# Patient Record
Sex: FEMALE | Race: WHITE | URBAN - METROPOLITAN AREA
[De-identification: names, ages, dates, MRNs, and addresses within clinical notes are randomized per-mention and may not be internally consistent; named-entity substitution may affect disease eponyms.]

---

## 2021-11-29 LAB
ANION GAP SERPL CALCULATED.3IONS-SCNC: 13 MEQ/L (ref 9–15)
BASOPHILS ABSOLUTE: 0.1 K/UL (ref 0–0.2)
BASOPHILS RELATIVE PERCENT: 1.3 %
BUN BLDV-MCNC: 8 MG/DL (ref 6–20)
CALCIUM SERPL-MCNC: 10 MG/DL (ref 8.5–9.9)
CHLORIDE BLD-SCNC: 99 MEQ/L (ref 95–107)
CO2: 28 MEQ/L (ref 20–31)
CREAT SERPL-MCNC: 0.46 MG/DL (ref 0.5–0.9)
EOSINOPHILS ABSOLUTE: 0 K/UL (ref 0–0.7)
EOSINOPHILS RELATIVE PERCENT: 0.4 %
GFR AFRICAN AMERICAN: >60
GFR NON-AFRICAN AMERICAN: >60
GLUCOSE BLD-MCNC: 80 MG/DL (ref 70–99)
HCT VFR BLD CALC: 32 % (ref 37–47)
HEMOGLOBIN: 10.5 G/DL (ref 12–16)
LYMPHOCYTES ABSOLUTE: 1 K/UL (ref 1–4.8)
LYMPHOCYTES RELATIVE PERCENT: 9.2 %
MCH RBC QN AUTO: 33 PG (ref 27–31.3)
MCHC RBC AUTO-ENTMCNC: 32.7 % (ref 33–37)
MCV RBC AUTO: 100.8 FL (ref 82–100)
MONOCYTES ABSOLUTE: 0.6 K/UL (ref 0.2–0.8)
MONOCYTES RELATIVE PERCENT: 5.7 %
NEUTROPHILS ABSOLUTE: 9 K/UL (ref 1.4–6.5)
NEUTROPHILS RELATIVE PERCENT: 83.4 %
PDW BLD-RTO: 16.3 % (ref 11.5–14.5)
PLATELET # BLD: 426 K/UL (ref 130–400)
POTASSIUM SERPL-SCNC: 3.8 MEQ/L (ref 3.4–4.9)
RBC # BLD: 3.18 M/UL (ref 4.2–5.4)
SODIUM BLD-SCNC: 140 MEQ/L (ref 135–144)
TSH SERPL DL<=0.05 MIU/L-ACNC: 2.25 UIU/ML (ref 0.44–3.86)
WBC # BLD: 10.8 K/UL (ref 4.8–10.8)

## 2021-11-30 ENCOUNTER — OFFICE VISIT (OUTPATIENT)
Dept: GERIATRIC MEDICINE | Age: 51
End: 2021-11-30
Payer: MEDICAID

## 2021-11-30 DIAGNOSIS — U07.1 COVID-19: Primary | ICD-10-CM

## 2021-11-30 PROCEDURE — 99305 1ST NF CARE MODERATE MDM 35: CPT | Performed by: INTERNAL MEDICINE

## 2021-11-30 PROCEDURE — G8484 FLU IMMUNIZE NO ADMIN: HCPCS | Performed by: INTERNAL MEDICINE

## 2021-12-01 DIAGNOSIS — R52 ACUTE PAIN: Primary | ICD-10-CM

## 2021-12-01 RX ORDER — OXYCODONE HYDROCHLORIDE AND ACETAMINOPHEN 5; 325 MG/1; MG/1
1 TABLET ORAL EVERY 4 HOURS PRN
Qty: 28 TABLET | Refills: 0 | Status: SHIPPED | OUTPATIENT
Start: 2021-12-01 | End: 2021-12-10 | Stop reason: SDUPTHER

## 2021-12-02 ENCOUNTER — OFFICE VISIT (OUTPATIENT)
Dept: GERIATRIC MEDICINE | Age: 51
End: 2021-12-02
Payer: MEDICAID

## 2021-12-02 DIAGNOSIS — U07.1 COVID-19: Primary | ICD-10-CM

## 2021-12-02 PROCEDURE — 99308 SBSQ NF CARE LOW MDM 20: CPT | Performed by: INTERNAL MEDICINE

## 2021-12-02 PROCEDURE — G8484 FLU IMMUNIZE NO ADMIN: HCPCS | Performed by: INTERNAL MEDICINE

## 2021-12-05 ENCOUNTER — OFFICE VISIT (OUTPATIENT)
Dept: GERIATRIC MEDICINE | Age: 51
End: 2021-12-05
Payer: MEDICAID

## 2021-12-05 DIAGNOSIS — U07.1 COVID-19: Primary | ICD-10-CM

## 2021-12-05 PROCEDURE — 99308 SBSQ NF CARE LOW MDM 20: CPT | Performed by: INTERNAL MEDICINE

## 2021-12-05 PROCEDURE — G8484 FLU IMMUNIZE NO ADMIN: HCPCS | Performed by: INTERNAL MEDICINE

## 2021-12-06 LAB
BASOPHILS ABSOLUTE: 0.1 K/UL (ref 0–0.2)
BASOPHILS RELATIVE PERCENT: 0.9 %
EOSINOPHILS ABSOLUTE: 0.1 K/UL (ref 0–0.7)
EOSINOPHILS RELATIVE PERCENT: 2 %
HCT VFR BLD CALC: 32.6 % (ref 37–47)
HEMOGLOBIN: 10.6 G/DL (ref 12–16)
LYMPHOCYTES ABSOLUTE: 1.3 K/UL (ref 1–4.8)
LYMPHOCYTES RELATIVE PERCENT: 18.5 %
MCH RBC QN AUTO: 32.5 PG (ref 27–31.3)
MCHC RBC AUTO-ENTMCNC: 32.4 % (ref 33–37)
MCV RBC AUTO: 100.5 FL (ref 82–100)
MONOCYTES ABSOLUTE: 0.5 K/UL (ref 0.2–0.8)
MONOCYTES RELATIVE PERCENT: 7 %
NEUTROPHILS ABSOLUTE: 5 K/UL (ref 1.4–6.5)
NEUTROPHILS RELATIVE PERCENT: 71.6 %
PDW BLD-RTO: 16.7 % (ref 11.5–14.5)
PLATELET # BLD: 426 K/UL (ref 130–400)
RBC # BLD: 3.25 M/UL (ref 4.2–5.4)
WBC # BLD: 7 K/UL (ref 4.8–10.8)

## 2021-12-08 ENCOUNTER — OFFICE VISIT (OUTPATIENT)
Dept: GERIATRIC MEDICINE | Age: 51
End: 2021-12-08
Payer: MEDICAID

## 2021-12-08 DIAGNOSIS — S42.291D: Primary | ICD-10-CM

## 2021-12-08 DIAGNOSIS — S32.502D CLOSED FRACTURE OF LEFT PUBIS WITH ROUTINE HEALING, UNSPECIFIED PORTION OF PUBIS, SUBSEQUENT ENCOUNTER: ICD-10-CM

## 2021-12-08 DIAGNOSIS — E87.6 HYPOKALEMIA: ICD-10-CM

## 2021-12-08 DIAGNOSIS — D69.6 THROMBOCYTOPENIA (HCC): ICD-10-CM

## 2021-12-08 DIAGNOSIS — S32.10XD CLOSED FRACTURE OF SACRUM WITH ROUTINE HEALING, UNSPECIFIED PORTION OF SACRUM, SUBSEQUENT ENCOUNTER: ICD-10-CM

## 2021-12-08 DIAGNOSIS — M84.459D: ICD-10-CM

## 2021-12-08 PROCEDURE — 99309 SBSQ NF CARE MODERATE MDM 30: CPT | Performed by: NURSE PRACTITIONER

## 2021-12-08 PROCEDURE — G8484 FLU IMMUNIZE NO ADMIN: HCPCS | Performed by: NURSE PRACTITIONER

## 2021-12-08 RX ORDER — THIAMINE MONONITRATE (VIT B1) 100 MG
100 TABLET ORAL DAILY
COMMUNITY

## 2021-12-08 RX ORDER — MAGNESIUM OXIDE 400 MG/1
400 TABLET ORAL DAILY
COMMUNITY

## 2021-12-08 RX ORDER — POTASSIUM CHLORIDE 20 MEQ/1
20 TABLET, EXTENDED RELEASE ORAL 2 TIMES DAILY
COMMUNITY

## 2021-12-08 RX ORDER — NICOTINE 21 MG/24HR
1 PATCH, TRANSDERMAL 24 HOURS TRANSDERMAL EVERY 24 HOURS
COMMUNITY

## 2021-12-08 RX ORDER — MULTIVIT-MINS NO.63/IRON/FOLIC 27 MG-1 MG
1 TABLET ORAL DAILY
COMMUNITY

## 2021-12-08 RX ORDER — FOLIC ACID 1 MG/1
1 TABLET ORAL DAILY
COMMUNITY

## 2021-12-10 DIAGNOSIS — R52 ACUTE PAIN: ICD-10-CM

## 2021-12-13 RX ORDER — OXYCODONE HYDROCHLORIDE AND ACETAMINOPHEN 5; 325 MG/1; MG/1
1 TABLET ORAL EVERY 6 HOURS PRN
Qty: 28 TABLET | Refills: 0 | Status: SHIPPED | OUTPATIENT
Start: 2021-12-13 | End: 2021-12-20

## 2021-12-14 ENCOUNTER — OFFICE VISIT (OUTPATIENT)
Dept: GERIATRIC MEDICINE | Age: 51
End: 2021-12-14
Payer: MEDICAID

## 2021-12-14 DIAGNOSIS — S42.291D: Primary | ICD-10-CM

## 2021-12-14 PROCEDURE — G8484 FLU IMMUNIZE NO ADMIN: HCPCS | Performed by: NURSE PRACTITIONER

## 2021-12-14 PROCEDURE — 99308 SBSQ NF CARE LOW MDM 20: CPT | Performed by: NURSE PRACTITIONER

## 2021-12-20 ENCOUNTER — OFFICE VISIT (OUTPATIENT)
Dept: GERIATRIC MEDICINE | Age: 51
End: 2021-12-20
Payer: MEDICAID

## 2021-12-20 DIAGNOSIS — U07.1 COVID: Primary | ICD-10-CM

## 2021-12-20 DIAGNOSIS — J02.9 PHARYNGITIS, UNSPECIFIED ETIOLOGY: ICD-10-CM

## 2021-12-20 LAB
ALBUMIN SERPL-MCNC: 4 G/DL (ref 3.5–4.6)
ALP BLD-CCNC: 143 U/L (ref 40–130)
ALT SERPL-CCNC: 17 U/L (ref 0–33)
ANION GAP SERPL CALCULATED.3IONS-SCNC: 15 MEQ/L (ref 9–15)
AST SERPL-CCNC: 25 U/L (ref 0–35)
BILIRUB SERPL-MCNC: 0.3 MG/DL (ref 0.2–0.7)
BUN BLDV-MCNC: 7 MG/DL (ref 6–20)
C-REACTIVE PROTEIN: 4.2 MG/L (ref 0–5)
CALCIUM SERPL-MCNC: 10 MG/DL (ref 8.5–9.9)
CHLORIDE BLD-SCNC: 96 MEQ/L (ref 95–107)
CO2: 24 MEQ/L (ref 20–31)
CREAT SERPL-MCNC: 0.44 MG/DL (ref 0.5–0.9)
D DIMER: 1.36 MG/L FEU (ref 0–0.5)
GFR AFRICAN AMERICAN: >60
GFR NON-AFRICAN AMERICAN: >60
GLOBULIN: 4.3 G/DL (ref 2.3–3.5)
GLUCOSE BLD-MCNC: 92 MG/DL (ref 70–99)
HCT VFR BLD CALC: 33.9 % (ref 37–47)
HEMOGLOBIN: 11.2 G/DL (ref 12–16)
INR BLD: 1.2
MCH RBC QN AUTO: 32.5 PG (ref 27–31.3)
MCHC RBC AUTO-ENTMCNC: 33 % (ref 33–37)
MCV RBC AUTO: 98.4 FL (ref 82–100)
PDW BLD-RTO: 15.3 % (ref 11.5–14.5)
PLATELET # BLD: 187 K/UL (ref 130–400)
POTASSIUM SERPL-SCNC: 4 MEQ/L (ref 3.4–4.9)
PROCALCITONIN: 0.13 NG/ML (ref 0–0.15)
PROTHROMBIN TIME: 15.3 SEC (ref 12.3–14.9)
RBC # BLD: 3.44 M/UL (ref 4.2–5.4)
SODIUM BLD-SCNC: 135 MEQ/L (ref 135–144)
TOTAL CK: 44 U/L (ref 0–170)
TOTAL PROTEIN: 8.3 G/DL (ref 6.3–8)
TROPONIN: <0.01 NG/ML (ref 0–0.01)
WBC # BLD: 6.6 K/UL (ref 4.8–10.8)

## 2021-12-20 PROCEDURE — G8484 FLU IMMUNIZE NO ADMIN: HCPCS | Performed by: NURSE PRACTITIONER

## 2021-12-20 PROCEDURE — 99497 ADVNCD CARE PLAN 30 MIN: CPT | Performed by: NURSE PRACTITIONER

## 2021-12-20 PROCEDURE — 99309 SBSQ NF CARE MODERATE MDM 30: CPT | Performed by: NURSE PRACTITIONER

## 2021-12-21 ENCOUNTER — OFFICE VISIT (OUTPATIENT)
Dept: GERIATRIC MEDICINE | Age: 51
End: 2021-12-21
Payer: MEDICAID

## 2021-12-21 DIAGNOSIS — U07.1 COVID: Primary | ICD-10-CM

## 2021-12-21 LAB — FERRITIN: 254 UG/L (ref 13–150)

## 2021-12-21 PROCEDURE — G8484 FLU IMMUNIZE NO ADMIN: HCPCS | Performed by: NURSE PRACTITIONER

## 2021-12-21 PROCEDURE — 99308 SBSQ NF CARE LOW MDM 20: CPT | Performed by: NURSE PRACTITIONER

## 2021-12-23 ENCOUNTER — OFFICE VISIT (OUTPATIENT)
Dept: GERIATRIC MEDICINE | Age: 51
End: 2021-12-23
Payer: MEDICAID

## 2021-12-23 DIAGNOSIS — U07.1 COVID-19: Primary | ICD-10-CM

## 2021-12-23 PROCEDURE — 99309 SBSQ NF CARE MODERATE MDM 30: CPT | Performed by: INTERNAL MEDICINE

## 2021-12-23 PROCEDURE — G8484 FLU IMMUNIZE NO ADMIN: HCPCS | Performed by: INTERNAL MEDICINE

## 2021-12-30 NOTE — PROGRESS NOTES
Subjective:     CC: COVID-19    HPI:  Eloy Dorsey is a 49 y.o. female was seen today for COVID 19 evaluation. Patient was admitted here after recent hospitalization for COVID-19 patient is at baseline cognitive. Patient was stabilized patient did undergo course physical therapy respiratory was patient has been on universal cautions transferred here for ongoing course of care for respiration impairment. Patient has been COVID 19 positive since 11/25. They were seen with full PPE and observing continued droplet precautions. Condition discussed with nursing staff and treatment reviewed. Recent bloodwork, chest x-ray and vital signs reviewed. Fever curve and oxygen demands reviewed. Nutritional status and intake reviewed. Patient is not demonstrating increased respiratory complaints at this time. Patient has not been febrile in the last 24 hours. Patient is able to feed themselves. Patient is not demonstrating increased oxygen demand. No past medical history on file. No past surgical history on file. No family history on file. Social History     Socioeconomic History    Marital status:      Spouse name: Not on file    Number of children: Not on file    Years of education: Not on file    Highest education level: Not on file   Occupational History    Not on file   Tobacco Use    Smoking status: Not on file    Smokeless tobacco: Not on file   Substance and Sexual Activity    Alcohol use: Not on file    Drug use: Not on file    Sexual activity: Not on file   Other Topics Concern    Not on file   Social History Narrative    Not on file     Social Determinants of Health     Financial Resource Strain:     Difficulty of Paying Living Expenses: Not on file   Food Insecurity:     Worried About Running Out of Food in the Last Year: Not on file    Shell of Food in the Last Year: Not on file   Transportation Needs:     Lack of Transportation (Medical):  Not on file    Lack of Transportation (Non-Medical): Not on file   Physical Activity:     Days of Exercise per Week: Not on file    Minutes of Exercise per Session: Not on file   Stress:     Feeling of Stress : Not on file   Social Connections:     Frequency of Communication with Friends and Family: Not on file    Frequency of Social Gatherings with Friends and Family: Not on file    Attends Ep[employer] Services: Not on file    Active Member of [address] [place] [address] or Organizations: Not on file    Attends Club or Organization Meetings: Not on file    Marital Status: Not on file   Intimate Partner Violence:     Fear of Current or Ex-Partner: Not on file    Emotionally Abused: Not on file    Physically Abused: Not on file    Sexually Abused: Not on file   Housing Stability:     Unable to Pay for Housing in the Last Year: Not on file    Number of [place] in the Last Year: Not on file    Unstable Housing in the Last Year: Not on file     No current outpatient medications on file prior to visit. No current facility-administered medications on file prior to visit. Review of Systems   Unable to perform ROS: Mental status change   All other systems reviewed and are negative. Objective:     Physical Exam  Vitals and nursing note reviewed. Constitutional:       Appearance: Normal appearance. HENT:      Head: Normocephalic. Mouth/Throat:      Mouth: Mucous membranes are moist.   Cardiovascular:      Rate and Rhythm: Normal rate and regular rhythm. Pulses: Normal pulses. Heart sounds: Normal heart sounds. Pulmonary:      Effort: Pulmonary effort is normal.      Breath sounds: Wheezing present. Abdominal:      General: Abdomen is flat. Palpations: Abdomen is soft. Musculoskeletal:         General: No swelling. Normal range of motion. Cervical back: Normal range of motion. No tenderness. Skin:     General: Skin is warm. Findings: No rash. Neurological:      Mental Status: She is alert. Mental status is at baseline. Psychiatric:         Mood and Affect: Mood normal.          .[facility]  Lab Results   Component Value Date    FERRITIN 254 (H) 12/20/2021     Lab Results   Component Value Date    WBC 6.6 12/20/2021    HGB 11.2 (L) 12/20/2021    HCT 33.9 (L) 12/20/2021    MCV 98.4 12/20/2021     12/20/2021     Lab Results   Component Value Date     12/20/2021    K 4.0 12/20/2021    CL 96 12/20/2021    CO2 24 12/20/2021    BUN 7 12/20/2021    CREATININE 0.44 12/20/2021    GLUCOSE 92 12/20/2021    CALCIUM 10.0 12/20/2021      Lab Results   Component Value Date    CKTOTAL 44 12/20/2021    TROPONINI <0.010 12/20/2021     Lab Results   Component Value Date    CRP 4.2 12/20/2021      Lab Results   Component Value Date    DDIMER 1.36 ([facility]) 12/20/2021      Lab Results   Component Value Date    CKTOTAL 44 12/20/2021       Please refer to on site chart for most recent chest Xray result, reviewed at this time. Assessment & Plan:     Continue with current regimen of  Vitamin C 500mg PO BID  Vitamin D 1000 IU PO QD  Zinc 50 mg PO QD  Lovenox 30mg SQ BID    Patient does require Dexamethasone 6mg PO QD  Patient does not  require antibiotics for concomitant bacterial pneumonia. Patient does not qualify for monoclonal antibodies. Continue to monitor blood work:  Weekly D-Dimer, LDH, CRP, CPK, Procalcitonin, Ferritin, Troponin, CBC, BMP. Patient's case discussed with nursing staff and Code Status reviewed. Please note orders entered on site at facility after discussion with appropriate facility nursing/therapy/ / nutritional staff. Current longstanding medical problems and acute medical issues addressed with staff. Available data and data elements in on site paper chart reviewed and analyzed. Current external consultant notes reviewed in on site chart. Ordered laboratory testing and imaging will be reviewed when available.      Zoya Chaudhari MD

## 2021-12-31 NOTE — PROGRESS NOTES
Subjective:     CC: COVID-19    HPI:  Ginger Traore is a 49 y.o. female was seen today for COVID 19 evaluation. Patient has been COVID 19 positive since 11/29. They were seen with full PPE and observing continued droplet precautions. Condition discussed with nursing staff and treatment reviewed. Recent bloodwork, chest x-ray and vital signs reviewed. Fever curve and oxygen demands reviewed. Nutritional status and intake reviewed. Patient is not demonstrating increased respiratory complaints at this time. Patient has not been febrile in the last 24 hours. Patient is able to feed themselves. Patient is not demonstrating increased oxygen demand. No past medical history on file. No past surgical history on file. No family history on file. Social History     Socioeconomic History    Marital status:      Spouse name: Not on file    Number of children: Not on file    Years of education: Not on file    Highest education level: Not on file   Occupational History    Not on file   Tobacco Use    Smoking status: Not on file    Smokeless tobacco: Not on file   Substance and Sexual Activity    Alcohol use: Not on file    Drug use: Not on file    Sexual activity: Not on file   Other Topics Concern    Not on file   Social History Narrative    Not on file     Social Determinants of Health     Financial Resource Strain:     Difficulty of Paying Living Expenses: Not on file   Food Insecurity:     Worried About Running Out of Food in the Last Year: Not on file    Shell of Food in the Last Year: Not on file   Transportation Needs:     Lack of Transportation (Medical): Not on file    Lack of Transportation (Non-Medical):  Not on file   Physical Activity:     Days of Exercise per Week: Not on file    Minutes of Exercise per Session: Not on file   Stress:     Feeling of Stress : Not on file   Social Connections:     Frequency of Communication with Friends and Family: Not on file    Frequency of Social Gatherings with Friends and Family: Not on file    Attends Gnosticist Services: Not on file    Active Member of Clubs or Organizations: Not on file    Attends Club or Organization Meetings: Not on file    Marital Status: Not on file   Intimate Partner Violence:     Fear of Current or Ex-Partner: Not on file    Emotionally Abused: Not on file    Physically Abused: Not on file    Sexually Abused: Not on file   Housing Stability:     Unable to Pay for Housing in the Last Year: Not on file    Number of [place] in the Last Year: Not on file    Unstable Housing in the Last Year: Not on file     No current outpatient medications on file prior to visit. No current facility-administered medications on file prior to visit. Review of Systems   Unable to perform ROS: Psychiatric disorder   All other systems reviewed and are negative. Objective:     Physical Exam  Vitals and nursing note reviewed. Constitutional:       Appearance: Normal appearance. She is obese. HENT:      Head: Normocephalic and atraumatic. Nose: Nose normal.      Mouth/Throat:      Mouth: Mucous membranes are moist.   Eyes:      Extraocular Movements: Extraocular movements intact. Cardiovascular:      Rate and Rhythm: Normal rate and regular rhythm. Pulses: Normal pulses. Pulmonary:      Effort: Pulmonary effort is normal.      Breath sounds: No wheezing. Abdominal:      General: Bowel sounds are normal.      Palpations: Abdomen is soft. There is no mass. Musculoskeletal:         General: No swelling. Normal range of motion. Cervical back: Neck supple. No tenderness. Skin:     General: Skin is warm and dry. Neurological:      Mental Status: Mental status is at baseline.    Psychiatric:         Mood and Affect: Mood normal.          .[facility]  Lab Results   Component Value Date    FERRITIN 254 (H) 12/20/2021     Lab Results   Component Value Date    WBC 6.6 12/20/2021    HGB 11.2 (L) 12/20/2021    HCT 33.9 (L) 12/20/2021    MCV 98.4 12/20/2021     12/20/2021     Lab Results   Component Value Date     12/20/2021    K 4.0 12/20/2021    CL 96 12/20/2021    CO2 24 12/20/2021    BUN 7 12/20/2021    CREATININE 0.44 12/20/2021    GLUCOSE 92 12/20/2021    CALCIUM 10.0 12/20/2021      Lab Results   Component Value Date    CKTOTAL 44 12/20/2021    TROPONINI <0.010 12/20/2021     Lab Results   Component Value Date    CRP 4.2 12/20/2021      Lab Results   Component Value Date    DDIMER 1.36 ([place]) 12/20/2021      Lab Results   Component Value Date    CKTOTAL 44 12/20/2021       Please refer to on site chart for most recent chest Xray result, reviewed at this time. Assessment & Plan:     Continue with current regimen of  Vitamin C 500mg PO BID  Vitamin D 1000 IU PO QD  Zinc 50 mg PO QD  Lovenox 30mg SQ BID    Patient does not require Dexamethasone 6mg PO QD  Patient does not  require antibiotics for concomitant bacterial pneumonia. Patient does qualify for monoclonal antibodies. Continue to monitor blood work:  Weekly D-Dimer, LDH, CRP, CPK, Procalcitonin, Ferritin, Troponin, CBC, BMP. Patient's case discussed with nursing staff and Code Status reviewed. Please note orders entered on site at facility after discussion with appropriate facility nursing/therapy/ / nutritional staff. Current longstanding medical problems and acute medical issues addressed with staff. Available data and data elements in on site paper chart reviewed and analyzed. Current external consultant notes reviewed in on site chart. Ordered laboratory testing and imaging will be reviewed when available.      Shukri Claire MD

## 2022-01-03 NOTE — PROGRESS NOTES
Subjective:     CC: COVID-19    HPI:  Jalen Tidwell is a 49 y.o. female was seen today for COVID 19 evaluation. Patient has been COVID 19 positive since 11/29. They were seen with full PPE and observing continued droplet precautions. Condition discussed with nursing staff and treatment reviewed. Recent bloodwork, chest x-ray and vital signs reviewed. Fever curve and oxygen demands reviewed. Nutritional status and intake reviewed. Patient is not demonstrating increased respiratory complaints at this time. Patient has not been febrile in the last 24 hours. Patient is able to feed themselves. Patient is not demonstrating increased oxygen demand. No past medical history on file. No past surgical history on file. No family history on file. Social History     Socioeconomic History    Marital status:      Spouse name: Not on file    Number of children: Not on file    Years of education: Not on file    Highest education level: Not on file   Occupational History    Not on file   Tobacco Use    Smoking status: Not on file    Smokeless tobacco: Not on file   Substance and Sexual Activity    Alcohol use: Not on file    Drug use: Not on file    Sexual activity: Not on file   Other Topics Concern    Not on file   Social History Narrative    Not on file     Social Determinants of Health     Financial Resource Strain:     Difficulty of Paying Living Expenses: Not on file   Food Insecurity:     Worried About Running Out of Food in the Last Year: Not on file    Shell of Food in the Last Year: Not on file   Transportation Needs:     Lack of Transportation (Medical): Not on file    Lack of Transportation (Non-Medical):  Not on file   Physical Activity:     Days of Exercise per Week: Not on file    Minutes of Exercise per Session: Not on file   Stress:     Feeling of Stress : Not on file   Social Connections:     Frequency of Communication with Friends and Family: Not on file    Frequency of Social Gatherings with Friends and Family: Not on file    Attends Mu-ism Services: Not on file    Active Member of Clubs or Organizations: Not on file    Attends Club or Organization Meetings: Not on file    Marital Status: Not on file   Intimate Partner Violence:     Fear of Current or Ex-Partner: Not on file    Emotionally Abused: Not on file    Physically Abused: Not on file    Sexually Abused: Not on file   Housing Stability:     Unable to Pay for Housing in the Last Year: Not on file    Number of [place] in the Last Year: Not on file    Unstable Housing in the Last Year: Not on file     No current outpatient medications on file prior to visit. No current facility-administered medications on file prior to visit. Review of Systems   Unable to perform ROS: Mental status change   All other systems reviewed and are negative. Objective:     Physical Exam  Vitals and nursing note reviewed. Constitutional:       Appearance: Normal appearance. She is obese. HENT:      Head: Normocephalic. Nose: Nose normal.      Mouth/Throat:      Mouth: Mucous membranes are moist.   Eyes:      Extraocular Movements: Extraocular movements intact. Cardiovascular:      Rate and Rhythm: Normal rate and regular rhythm. Pulmonary:      Effort: Pulmonary effort is normal.      Breath sounds: Wheezing present. Abdominal:      General: Bowel sounds are normal.      Palpations: Abdomen is soft. Musculoskeletal:         General: No swelling. Cervical back: Normal range of motion. Skin:     General: Skin is warm. Findings: No erythema. Neurological:      Mental Status: Mental status is at baseline.    Psychiatric:         Mood and Affect: Mood normal.          .[facility]  Lab Results   Component Value Date    FERRITIN 254 (H) 12/20/2021     Lab Results   Component Value Date    WBC 6.6 12/20/2021    HGB 11.2 (L) 12/20/2021    HCT 33.9 (L) 12/20/2021    MCV 98.4 12/20/2021  12/20/2021     Lab Results   Component Value Date     12/20/2021    K 4.0 12/20/2021    CL 96 12/20/2021    CO2 24 12/20/2021    BUN 7 12/20/2021    CREATININE 0.44 12/20/2021    GLUCOSE 92 12/20/2021    CALCIUM 10.0 12/20/2021      Lab Results   Component Value Date    CKTOTAL 44 12/20/2021    TROPONINI <0.010 12/20/2021     Lab Results   Component Value Date    CRP 4.2 12/20/2021      Lab Results   Component Value Date    DDIMER 1.36 ([place]) 12/20/2021      Lab Results   Component Value Date    CKTOTAL 44 12/20/2021       Please refer to on site chart for most recent chest Xray result, reviewed at this time. Assessment & Plan:     Continue with current regimen of  Vitamin C 500mg PO BID  Vitamin D 1000 IU PO QD  Zinc 50 mg PO QD  Lovenox 30mg SQ BID    Patient does require Dexamethasone 6mg PO QD  Patient does not  require antibiotics for concomitant bacterial pneumonia. Patient does not qualify for monoclonal antibodies. Continue to monitor blood work:  Weekly D-Dimer, LDH, CRP, CPK, Procalcitonin, Ferritin, Troponin, CBC, BMP. Patient's case discussed with nursing staff and Code Status reviewed. Please note orders entered on site at facility after discussion with appropriate facility nursing/therapy/ / nutritional staff. Current longstanding medical problems and acute medical issues addressed with staff. Available data and data elements in on site paper chart reviewed and analyzed. Current external consultant notes reviewed in on site chart. Ordered laboratory testing and imaging will be reviewed when available.      Myrna Villanueva MD

## 2022-01-17 VITALS
TEMPERATURE: 98.9 F | SYSTOLIC BLOOD PRESSURE: 97 MMHG | OXYGEN SATURATION: 95 % | DIASTOLIC BLOOD PRESSURE: 61 MMHG | HEART RATE: 116 BPM | RESPIRATION RATE: 18 BRPM

## 2022-01-17 PROBLEM — J02.9 PHARYNGITIS: Status: ACTIVE | Noted: 2022-01-17

## 2022-01-17 PROBLEM — U07.1 COVID: Status: ACTIVE | Noted: 2022-01-17

## 2022-01-17 NOTE — PROGRESS NOTES
[address]  Chief Complaint   Patient presents with    Cough    Pharyngitis       REASON FOR VISIT:  The patient being seen today for acute visit for pharyngitis and cough. Resident reports that she has a cough and the throat is hurting. Otherwise she offers no concerns. Nursing staff report resident remains at her baseline. FAMILY AND SOCIAL HISTORY:  Refer to H&P. No past medical history on file. MEDICATIONS AND ALLERGIES:  Reviewed on chart at nursing facility. Current Outpatient Medications on File Prior to Visit   Medication Sig Dispense Refill    folic acid (FOLVITE) 1 MG tablet Take 1 mg by mouth daily Indications: Nutritional Support      Prenatal Vit-Fe Fumarate-FA (M-VIT) TABS Take 1 tablet by mouth Daily Indications: Nutritional Support      magnesium oxide (MAG-OX) 400 MG tablet Take 400 mg by mouth daily      Magnesium Oxide 500 MG (LAX) TABS Take 1 tablet by mouth Daily Indications: Nutritional Support      metoprolol tartrate (LOPRESSOR) 25 MG tablet Take 25 mg by mouth 2 times daily Indications: High Blood Pressure Disorder      nicotine (NICODERM CQ) 21 MG/24HR Place 1 patch onto the skin every 24 hours Indications: Treatment to Stop Smoking      vitamin B-1 (THIAMINE) 100 MG tablet Take 100 mg by mouth daily Indications: Nutritional Support      potassium chloride (KLOR-CON M) 20 MEQ extended release tablet Take 20 mEq by mouth 2 times daily Indications: Nutritional Support       No current facility-administered medications on file prior to visit. REVIEW OF SYSTEMS:  Resident denies any headache, dizziness, blurred vision, fever or chills. She does report a sore throat and cough that is nonproductive, more dry and hacking. She is COVID positive. She denies any rashes, bruises or open areas. She denies any chest pain, chest discomfort or palpitations. She denies any nausea or vomiting, heartburn, or abdominal discomfort.   She denies any urinary frequency, urgency, or dysuria. She denies any diarrhea or constipation. She states she is eating okay and her  is bringing in additional food and snacks and she is sleeping okay. She currently has no pain. PE:  Most recent vital signs BP 97/61, heart rate 116, pulse oxing 95% on room air. Temp 98.9, respirations 18. Constitutional:  Resident is alert female in no apparent distress, pleasant and cooperative with examination. Integ:  Pink, warm, dry. No rashes, bruises or open areas visible. HEENT:  Normocephalic, atraumatic. External ears within normal limits. Hearing adequate for stated age. Conjunctivae pink. Sclerae nonicteric. Mucous membranes pink, moist.  No oropharyngeal exudate. Neck:  Supple. No cervical or clavicular lymphadenopathy. No masses noted. Cardiovascular:  Heart rate regular rate and rhythm. No murmurs, gallops, rubs noted. Respiratory:  Lung sounds are clear through all fields. Respirations even, nonlabored. She is currently pulse oxing 95% on room air and no use of accessory muscles with breathing. Abdomen:  Soft, nontender, nondistended, normoactive bowel sounds. No masses noted. Musculoskeletal:  No muscle tenderness. No joint discomfort other than her right shoulder for which she is being followed by Ortho. PV:  Peripheral pulses present. No edema noted. Psychological:  Mood is stable. Affect pleasant. Speech normal rate and tone. ASSESSMENT AND PLAN:  1. COVID positive. Resident has remained stable thus far. She is pulse oxing 95% on room air. No use of accessory muscles with breathing. No hypoxic episodes or episodes of respiratory distress. She has been vaccinated  2. Pharyngitis. I will order Cepacol lozenges for the patient. She can have one p.o. q. 2 p.r.n. Cough. Resident reports that she is taking Robitussin for her cough, however she is still coughing frequently.   We will add Tessalon Perles on to her medications as well as COVID medications have been initiated. We will also add an albuterol inhaler 2 puff q.4 routine and then q. 2 p.r.n. for shortness of breath or wheezing. I have reviewed this resident's medication and treatment plan as well as most recent laboratory work. No further changes will be made at this time. We did discuss the patient's code status and she reports that she is currently a full code status. I did explain to the patient that this virus can cause severe respiratory problems possibly requiring intubation. Patient reports that in the event that her heart would stop she would want CPR, life sustaining medications and if needed intubation. Return in about 1 week (around 12/27/2021), or if symptoms worsen or fail to improve. Please note this report is partially produced by using speech recognition hardware. It may contain errors related to the system, including grammar, punctuation and spelling as well as words and phrases that may seem inaccurate. For any questions or concerns feel free to contact me for clarification           Electronically Signed By: Janine Stewart CNP on 01/17/2022 00:06:40  ______________________________  Janine Stewart CNP  [id number]  D: 01/16/2022 21:23:34  T: 01/16/2022 21:42:58    cc: - [facility]

## 2022-01-18 NOTE — PROGRESS NOTES
[facility]  Chief Complaint   Patient presents with    Other     covid       REASON FOR VISIT:  The patient is being seen today for acute visit for COVID. SUBJECTIVE:  Nursing staff report resident remains at her baseline. FAMILY AND SOCIAL HISTORY:  Refer to H&P. No past medical history on file. MEDICATIONS AND ALLERGIES:  Reviewed on chart at nursing facility. Current Outpatient Medications on File Prior to Visit   Medication Sig Dispense Refill    folic acid (FOLVITE) 1 MG tablet Take 1 mg by mouth daily Indications: Nutritional Support      Prenatal Vit-Fe Fumarate-FA (M-VIT) TABS Take 1 tablet by mouth Daily Indications: Nutritional Support      magnesium oxide (MAG-OX) 400 MG tablet Take 400 mg by mouth daily      Magnesium Oxide 500 MG (LAX) TABS Take 1 tablet by mouth Daily Indications: Nutritional Support      metoprolol tartrate (LOPRESSOR) 25 MG tablet Take 25 mg by mouth 2 times daily Indications: High Blood Pressure Disorder      nicotine (NICODERM CQ) 21 MG/24HR Place 1 patch onto the skin every 24 hours Indications: Treatment to Stop Smoking      vitamin B-1 (THIAMINE) 100 MG tablet Take 100 mg by mouth daily Indications: Nutritional Support      potassium chloride (KLOR-CON M) 20 MEQ extended release tablet Take 20 mEq by mouth 2 times daily Indications: Nutritional Support       No current facility-administered medications on file prior to visit. REVIEW OF SYSTEMS:  Resident denies any headache, dizziness, blurred vision, fever, chills, or sore throat. She denies any chest pain, chest discomfort, or heart palpitations. Denies any shortness of breath or cough. Denies any abdominal pain, nausea, or vomiting. She denies any urinary frequency, urgency, or dysuria. She denies any constipation or diarrhea. She reports she has not had any rashes, bruises or open areas. She reports she is eating okay, sleeping okay, and has no pain.   She is looking forward to going home soon. PHYSICAL EXAMINATION:  Most recent vital signs:  BP 97/67, heart rate 87, temperature 97.9, pulse oxing 98% on room air. CONSTITUTIONAL:  Resident is alert and oriented x3 female, in no apparent distress appears her stated age, pleasant and cooperative with exam.  INTEGUMENT:  Pink, warm, dry. No rashes, bruises or open areas visible. HEENT:  Normocephalic, atraumatic. External ears appear to be within normal limits. Hearing adequate for age. Conjunctivae pink. Sclerae nonicteric. Mucous membranes pink, moist.  No oropharyngeal exudates. NECK:  Supple. No cervical or clavicular lymphadenopathy. No masses noted. CARDIOVASCULAR:  Heart rate is regular rate and rhythm. No murmurs, gallops, or rubs noted. RESPIRATORY:  Lung sounds are clear throughout all fields. Respirations even, nonlabored. No use of accessory muscles with breathing. She is currently pulse oxing 98% on room air. No cough noted. ABDOMEN:  Soft, nontender, nondistended. Normoactive bowel sounds. No masses noted. MUSCULOSKELETAL:  No muscle tenderness. No joint discomfort. PV:  Peripheral pulses present. No edema noted. ASSESSMENT AND PLAN:  1. COVID. Resident has been stable. She has not had any hypoxic episodes or episodes of respiratory distress. No use of accessory muscles with breathing. No cough noted. She is currently pulse oxing 98% on room air. I have reviewed this resident's medication and treatment plan, as well as, most recent lab work. No further changes will be made at this time. Return in about 1 week (around 12/28/2021), or if symptoms worsen or fail to improve. Please note this report is partially produced by using speech recognition hardware. It may contain errors related to the system, including grammar, punctuation and spelling as well as words and phrases that may seem inaccurate.   For any questions or concerns feel free to contact me for clarification ________________________    [facility] Jose LUGO, [address], [address] [place] [address]  Office [phone]  Fax [phone]  Cc.  [place]

## 2022-01-19 VITALS
TEMPERATURE: 97.9 F | HEART RATE: 87 BPM | SYSTOLIC BLOOD PRESSURE: 97 MMHG | OXYGEN SATURATION: 98 % | DIASTOLIC BLOOD PRESSURE: 67 MMHG

## 2022-01-23 NOTE — PROGRESS NOTES
Subjective:     CC: COVID-19    HPI:  Anna Lilly is a 49 y.o. female was seen today for COVID 19 evaluation. Patient has been COVID 19 positive since 12/23. They were seen with full PPE and observing continued droplet precautions. Condition discussed with nursing staff and treatment reviewed. Recent bloodwork, chest x-ray and vital signs reviewed. Fever curve and oxygen demands reviewed. Nutritional status and intake reviewed. Patient is not demonstrating increased respiratory complaints at this time. Patient has not been febrile in the last 24 hours. Patient is able to feed themselves. Patient is demonstrating increased oxygen demand. No past medical history on file. No past surgical history on file. No family history on file. Social History     Socioeconomic History    Marital status:      Spouse name: Not on file    Number of children: Not on file    Years of education: Not on file    Highest education level: Not on file   Occupational History    Not on file   Tobacco Use    Smoking status: Not on file    Smokeless tobacco: Not on file   Substance and Sexual Activity    Alcohol use: Not on file    Drug use: Not on file    Sexual activity: Not on file   Other Topics Concern    Not on file   Social History Narrative    Not on file     Social Determinants of Health     Financial Resource Strain:     Difficulty of Paying Living Expenses: Not on file   Food Insecurity:     Worried About Running Out of Food in the Last Year: Not on file    Shell of Food in the Last Year: Not on file   Transportation Needs:     Lack of Transportation (Medical): Not on file    Lack of Transportation (Non-Medical):  Not on file   Physical Activity:     Days of Exercise per Week: Not on file    Minutes of Exercise per Session: Not on file   Stress:     Feeling of Stress : Not on file   Social Connections:     Frequency of Communication with Friends and Family: Not on file    Frequency of Social Gatherings with Friends and Family: Not on file    Attends Hinduism Services: Not on file    Active Member of Clubs or Organizations: Not on file    Attends Club or Organization Meetings: Not on file    Marital Status: Not on file   Intimate Partner Violence:     Fear of Current or Ex-Partner: Not on file    Emotionally Abused: Not on file    Physically Abused: Not on file    Sexually Abused: Not on file   Housing Stability:     Unable to Pay for Housing in the Last Year: Not on file    Number of [place] in the Last Year: Not on file    Unstable Housing in the Last Year: Not on file     Current Outpatient Medications on File Prior to Visit   Medication Sig Dispense Refill    folic acid (FOLVITE) 1 MG tablet Take 1 mg by mouth daily Indications: Nutritional Support      Prenatal Vit-Fe Fumarate-FA (M-VIT) TABS Take 1 tablet by mouth Daily Indications: Nutritional Support      magnesium oxide (MAG-OX) 400 MG tablet Take 400 mg by mouth daily      Magnesium Oxide 500 MG (LAX) TABS Take 1 tablet by mouth Daily Indications: Nutritional Support      metoprolol tartrate (LOPRESSOR) 25 MG tablet Take 25 mg by mouth 2 times daily Indications: High Blood Pressure Disorder      nicotine (NICODERM CQ) 21 MG/24HR Place 1 patch onto the skin every 24 hours Indications: Treatment to Stop Smoking      vitamin B-1 (THIAMINE) 100 MG tablet Take 100 mg by mouth daily Indications: Nutritional Support      potassium chloride (KLOR-CON M) 20 MEQ extended release tablet Take 20 mEq by mouth 2 times daily Indications: Nutritional Support       No current facility-administered medications on file prior to visit. Review of Systems   Constitutional: Positive for fatigue. HENT: Positive for congestion. Respiratory: Positive for cough and shortness of breath. Cardiovascular: Negative for leg swelling. Musculoskeletal: Positive for back pain. Neurological: Positive for weakness. All other systems reviewed and are negative. Objective:     Physical Exam  Vitals and nursing note reviewed. Constitutional:       Appearance: Normal appearance. She is normal weight. HENT:      Head: Normocephalic. Nose: Nose normal.      Mouth/Throat:      Mouth: Mucous membranes are moist.   Eyes:      Extraocular Movements: Extraocular movements intact. Cardiovascular:      Rate and Rhythm: Normal rate and regular rhythm. Pulses: Normal pulses. Pulmonary:      Effort: Pulmonary effort is normal.      Breath sounds: No wheezing. Abdominal:      General: Bowel sounds are normal.      Palpations: Abdomen is soft. Tenderness: There is no abdominal tenderness. Musculoskeletal:      Cervical back: Neck supple. Skin:     General: Skin is warm. Findings: Bruising present. Neurological:      Mental Status: Mental status is at baseline. Psychiatric:         Mood and Affect: Mood normal.          .[facility]  Lab Results   Component Value Date    FERRITIN 254 (H) 12/20/2021     Lab Results   Component Value Date    WBC 6.6 12/20/2021    HGB 11.2 (L) 12/20/2021    HCT 33.9 (L) 12/20/2021    MCV 98.4 12/20/2021     12/20/2021     Lab Results   Component Value Date     12/20/2021    K 4.0 12/20/2021    CL 96 12/20/2021    CO2 24 12/20/2021    BUN 7 12/20/2021    CREATININE 0.44 12/20/2021    GLUCOSE 92 12/20/2021    CALCIUM 10.0 12/20/2021      Lab Results   Component Value Date    CKTOTAL 44 12/20/2021    TROPONINI <0.010 12/20/2021     Lab Results   Component Value Date    CRP 4.2 12/20/2021      Lab Results   Component Value Date    DDIMER 1.36 (HH) 12/20/2021      Lab Results   Component Value Date    CKTOTAL 44 12/20/2021       Please refer to on site chart for most recent chest Xray result, reviewed at this time. Assessment & Plan:      Diagnosis Orders   1.  COVID-19         Continue with current regimen of  Vitamin C 500mg PO BID  Vitamin D 1000 IU PO QD  Zinc 50 mg PO QD  Lovenox 30mg SQ BID    Patient does not require Dexamethasone 6mg PO QD  Patient does not  require antibiotics for concomitant bacterial pneumonia. Patient does not qualify for monoclonal antibodies. Continue to monitor blood work:  Weekly D-Dimer, LDH, CRP, CPK, Procalcitonin, Ferritin, Troponin, CBC, BMP. Patient's case discussed with nursing staff and Code Status reviewed. Please note orders entered on site at facility after discussion with appropriate facility nursing/therapy/ / nutritional staff. Current longstanding medical problems and acute medical issues addressed with staff. Available data and data elements in on site paper chart reviewed and analyzed. Current external consultant notes reviewed in on site chart. Ordered laboratory testing and imaging will be reviewed when available.      Norberto Swan MD

## 2022-01-29 PROBLEM — S42.291D: Status: ACTIVE | Noted: 2022-01-29

## 2022-01-29 PROBLEM — S32.502D CLOSED FRACTURE OF LEFT PUBIS WITH ROUTINE HEALING: Status: ACTIVE | Noted: 2022-01-29

## 2022-01-29 PROBLEM — E87.6 HYPOKALEMIA: Status: ACTIVE | Noted: 2022-01-29

## 2022-01-29 PROBLEM — M84.459D: Status: ACTIVE | Noted: 2022-01-29

## 2022-01-29 PROBLEM — S32.10XD CLOSED FRACTURE OF SACRUM WITH ROUTINE HEALING: Status: ACTIVE | Noted: 2022-01-29

## 2022-01-29 PROBLEM — D69.6 THROMBOCYTOPENIA (HCC): Status: ACTIVE | Noted: 2022-01-29

## 2022-01-29 NOTE — PROGRESS NOTES
Nursing Home:  [address] [place] [address] Road    The patient is being seen today for follow-up after recent admission to this facility for:  Chief Complaint   Patient presents with    Follow-Up from Hospital         SUBJECTIVE: Patient being seen today for follow-up after recent admission to this facility with primary diagnosis of fractured right humerus, fracture left pubis, fracture sacrum, thrombocytopenia, pathological hip fracture, and hypokalemia. FAMILY AND SOCIAL HISTORY:  Refer to H&P. No past medical history on file. No family history on file. Social History     Socioeconomic History    Marital status:      Spouse name: Not on file    Number of children: Not on file    Years of education: Not on file    Highest education level: Not on file   Occupational History    Not on file   Tobacco Use    Smoking status: Not on file    Smokeless tobacco: Not on file   Substance and Sexual Activity    Alcohol use: Not on file    Drug use: Not on file    Sexual activity: Not on file   Other Topics Concern    Not on file   Social History Narrative    Not on file     Social Determinants of Health     Financial Resource Strain:     Difficulty of Paying Living Expenses: Not on file   Food Insecurity:     Worried About Running Out of Food in the Last Year: Not on file    Shell of Food in the Last Year: Not on file   Transportation Needs:     Lack of Transportation (Medical): Not on file    Lack of Transportation (Non-Medical):  Not on file   Physical Activity:     Days of Exercise per Week: Not on file    Minutes of Exercise per Session: Not on file   Stress:     Feeling of Stress : Not on file   Social Connections:     Frequency of Communication with Friends and Family: Not on file    Frequency of Social Gatherings with Friends and Family: Not on file    Attends Baptism Services: Not on file    Active Member of Clubs or Organizations: Not on file    Attends Club or Organization Meetings: Not on file    Marital Status: Not on file   Intimate Partner Violence:     Fear of Current or Ex-Partner: Not on file    Emotionally Abused: Not on file    Physically Abused: Not on file    Sexually Abused: Not on file   Housing Stability:     Unable to Pay for Housing in the Last Year: Not on file    Number of [place] in the Last Year: Not on file    Unstable Housing in the Last Year: Not on file     ALLERGIES:  Patient has no known allergies. MEDICATIONS: Folic acid 1 mg p.o. daily prenatal vitamin 1 tab p.o. daily magnesium oxide 400 mg p.o. daily metoprolol tartrate 25 mg p.o. twice daily NicoDerm CQ 21 mg per 24-hour transdermal patch daily   vitamin B1 thiamine 100 mg tab p.o. daily potassium chloride 20 M EQ p.o. twice daily    REVIEW OF SYSTEMS:  Resident denies any headache, dizziness, blurred vision. She denies any fever, chills, sore throat. She denies any rashes, bruises, or open areas. She denies any chest pain, chest discomfort, or heart palpitations. She denies any shortness of breath or cough. She denies any nausea, vomiting, or abdominal pain. She denies any urinary urgency, frequency, or dysuria. She denies any constipation or diarrhea. She states she is eating okay, sleeping okay, and she currently has no pain. She reports that when she does have pain most of the pain is in her right shoulder. She does report that they are giving her something for pain which is effective meeting her pain needs otherwise no concerns. PHYSICAL EXAMINATION:  Most recent vital signs: BP 95/62, temperature 97.7, heart rate 79, respirations 18, pulse ox 98% room air. Constitutional:  Resident is a 49 y.o. female alert, pleasant, and cooperative with exam.  In no apparent distress. Integument:  Pink, warm, dry. Scattered bruises right side various stages of healing. No rashes or open areas noted. HEENT:  Normocephalic, atraumatic.   External ears appear to be within normal limits. Conjunctivae pink. Sclerae nonicteric. Mucous membranes pink, moist.  No oropharyngeal exudate. Neck:  Supple. No cervical or clavicular lymphadenopathy. No masses noted. Cardiovascular:  Heart rate regular rate and rhythm. No murmurs, gallops, rubs noted. Respiratory:  Lung sounds Normal.  Respirations nonlabored. The patient is not using accessory muscles with breathing. Currently pulse oxing 98% on room air. Abdomen:  Soft, nontender, nondistended, normoactive bowel sounds. No masses noted. Musculoskeletal: No muscle tenderness. No joint tenderness. PV:  Peripheral pulses present. She  does not have any edema to her extremities. Psychological: Mood stable. Affect pleasant. Speech normal rate and tone. ASSESSMENT AND PLAN:      Diagnosis Orders   1. Fracture, humerus, anatomical neck, right, with routine healing, subsequent encounter     2. Closed fracture of left pubis with routine healing, unspecified portion of pubis, subsequent encounter     3. Closed fracture of sacrum with routine healing, unspecified portion of sacrum, subsequent encounter     4. Thrombocytopenia ([address]     5. Pathological fracture, hip, unspecified, subsequent encounter for fracture with routine healing     6. Hypokalemia          1. No current reports of pain. She reports when she does have pain the nurse gives her medication that is effective meeting her pain needs. 2. She continues to work with therapy. She feels she is making progress. 3. She does report that she has follow-up with Ortho on Monday. She does have pain medication should she require. She will continue working with PT and OT. 4. Current platelet count 025. She has not had any abnormal bleeding or bruising. 5. Patient reports she is doing well in therapy and she is currently not having any pain. She is using a wheelchair for ambulation due to the numerous fractures.   6. Most recent potassium level 3.8 we will continue to monitor. I have reviewed this resident's medication and treatment plan as well as most recent lab work. No further changes will be made at this time. Return in about 1 week (around 12/15/2021), or if symptoms worsen or fail to improve. Please note this report is partially produced by using speech recognition hardware. It may contain errors related to the system, including grammar, punctuation and spelling as well as words and phrases that may seem inaccurate. For any questions or concerns feel free to contact me for clarification        Electronically Signed By: Ofelia Valencia. Dary Metcalf CNP on 1/29/22   ______________________________  Ofelia Valencia.  Jed LUGO CNP

## 2022-01-30 NOTE — PROGRESS NOTES
Nursing Home:  [address] [place] [address]    The patient is being seen today for acute visit for:  Chief Complaint   Patient presents with    Fracture     rt. humerus         SUBJECTIVE: Patient being seen for acute visit for right humerus fracture. FAMILY AND SOCIAL HISTORY:  Refer to H&P. No past medical history on file. MEDICATIONS AND ALLERGIES:  Reviewed on chart at nursing facility. Current Outpatient Medications on File Prior to Visit   Medication Sig Dispense Refill    folic acid (FOLVITE) 1 MG tablet Take 1 mg by mouth daily Indications: Nutritional Support      Prenatal Vit-Fe Fumarate-FA (M-VIT) TABS Take 1 tablet by mouth Daily Indications: Nutritional Support      magnesium oxide (MAG-OX) 400 MG tablet Take 400 mg by mouth daily      Magnesium Oxide 500 MG (LAX) TABS Take 1 tablet by mouth Daily Indications: Nutritional Support      metoprolol tartrate (LOPRESSOR) 25 MG tablet Take 25 mg by mouth 2 times daily Indications: High Blood Pressure Disorder      nicotine (NICODERM CQ) 21 MG/24HR Place 1 patch onto the skin every 24 hours Indications: Treatment to Stop Smoking      vitamin B-1 (THIAMINE) 100 MG tablet Take 100 mg by mouth daily Indications: Nutritional Support      potassium chloride (KLOR-CON M) 20 MEQ extended release tablet Take 20 mEq by mouth 2 times daily Indications: Nutritional Support       No current facility-administered medications on file prior to visit. REVIEW OF SYSTEMS:  Resident denies any headache, dizziness, blurred vision. She denies any fever, chills, sore throat. She denies any rashes, bruises, or open areas. She denies any chest pain, chest discomfort, or heart palpitations. She denies any shortness of breath or cough. She denies any nausea, vomiting, or abdominal pain. She denies any urinary urgency, frequency, or dysuria. She denies any constipation or diarrhea.  She states she is eating okay, sleeping okay, and she currently has no pain. PHYSICAL EXAMINATION:  Most recent vital signs: Blood pressure 126/70, temperature 98.6, heart rate 94, respirations 18, pulse oxing 97% room air, weight 114.4 pounds. Constitutional:  Resident is a 49 y.o. female alert, pleasant, and cooperative with exam.  In no apparent distress. Integument:  Pink, warm, dry. No rashes, bruises, or open areas visible. HEENT:  Normocephalic, atraumatic. External ears appear to be within normal limits. Conjunctivae pink. Sclerae nonicteric. Mucous membranes pink, moist.  No oropharyngeal exudate. Neck:  Supple. No cervical or clavicular lymphadenopathy. No masses noted. Cardiovascular:  Heart rate regular rate and rhythm. No murmurs, gallops, rubs noted. Respiratory:  Lung sounds Normal.  Respirations nonlabored. The patient is not using accessory muscles with breathing. Currently pulse oxing 97% on room air. Abdomen:  Soft, nontender, nondistended, normoactive bowel sounds. No masses noted. Musculoskeletal: No muscle tenderness. No joint tenderness. Patient has a sling on her right upper extremity. PV:  Peripheral pulses present. She  does not have any edema to her extremities. Psychological: Mood stable. Affect pleasant. Speech normal rate and tone. ASSESSMENT AND PLAN:      Diagnosis Orders   1. Fracture, humerus, anatomical neck, right, with routine healing, subsequent encounter          1. Patient reports she is currently not having any pain she feels that therapy is going well. She reports she is doing well with her pain medication taper that I started. She does report she began therapy on her right arm and her pain medication is effective in meeting her pain needs. We will continue to monitor. I have reviewed this resident's medication and treatment plan as well as most recent lab work. No further changes will be made at this time.       Return in about 1 week (around 12/21/2021), or if symptoms worsen or fail to improve. Please note this report is partially produced by using speech recognition hardware. It may contain errors related to the system, including grammar, punctuation and spelling as well as words and phrases that may seem inaccurate. For any questions or concerns feel free to contact me for clarification        Electronically Signed By: Vania Lyons. Mitch Bowen CNP on 1/30/22   ______________________________  Vania LUGO CNP

## 2022-03-03 ASSESSMENT — ENCOUNTER SYMPTOMS
BACK PAIN: 1
SHORTNESS OF BREATH: 1
COUGH: 1

## 2023-11-04 ENCOUNTER — HOSPITAL ENCOUNTER (EMERGENCY)
Facility: HOSPITAL | Age: 53
Discharge: HOME | End: 2023-11-04
Payer: COMMERCIAL

## 2023-11-04 VITALS
SYSTOLIC BLOOD PRESSURE: 156 MMHG | BODY MASS INDEX: 18.33 KG/M2 | TEMPERATURE: 98.2 F | RESPIRATION RATE: 14 BRPM | OXYGEN SATURATION: 99 % | WEIGHT: 110 LBS | HEIGHT: 65 IN | DIASTOLIC BLOOD PRESSURE: 93 MMHG

## 2023-11-04 DIAGNOSIS — R21 RASH: Primary | ICD-10-CM

## 2023-11-04 PROCEDURE — 2500000004 HC RX 250 GENERAL PHARMACY W/ HCPCS (ALT 636 FOR OP/ED): Performed by: REGISTERED NURSE

## 2023-11-04 PROCEDURE — 99283 EMERGENCY DEPT VISIT LOW MDM: CPT

## 2023-11-04 RX ORDER — PREDNISONE 20 MG/1
60 TABLET ORAL ONCE
Status: COMPLETED | OUTPATIENT
Start: 2023-11-04 | End: 2023-11-04

## 2023-11-04 RX ORDER — PREDNISONE 20 MG/1
60 TABLET ORAL DAILY
Qty: 15 TABLET | Refills: 0 | Status: SHIPPED | OUTPATIENT
Start: 2023-11-04 | End: 2023-11-09

## 2023-11-04 RX ADMIN — PREDNISONE 60 MG: 20 TABLET ORAL at 11:04

## 2023-11-04 ASSESSMENT — COLUMBIA-SUICIDE SEVERITY RATING SCALE - C-SSRS
2. HAVE YOU ACTUALLY HAD ANY THOUGHTS OF KILLING YOURSELF?: NO
6. HAVE YOU EVER DONE ANYTHING, STARTED TO DO ANYTHING, OR PREPARED TO DO ANYTHING TO END YOUR LIFE?: NO
1. IN THE PAST MONTH, HAVE YOU WISHED YOU WERE DEAD OR WISHED YOU COULD GO TO SLEEP AND NOT WAKE UP?: NO

## 2023-11-04 ASSESSMENT — PAIN SCALES - GENERAL: PAINLEVEL_OUTOF10: 0 - NO PAIN

## 2023-11-04 ASSESSMENT — PAIN - FUNCTIONAL ASSESSMENT: PAIN_FUNCTIONAL_ASSESSMENT: 0-10

## 2023-11-04 NOTE — ED PROVIDER NOTES
HPI   Chief Complaint   Patient presents with    Rash     C/o body rash x3 weeks. States used OTC medications but didn't help.       53-year-old female who denies significant past medical history presents emergency department today for evaluation of a 3-week rash.  Patient tells me that for the last 3 weeks she has been experiencing a rash to bilateral arms and chest.  Patient tells me that she has been using over-the-counter Benadryl with minimal relief.  Patient denies changing any soaps, denies any new perfumes, denies any new lotions, patient also denies any new laundry detergent or any outside contacts.  Patient denies any chest pain, shortness of breath, denies any difficulty breathing, denies any fever or chills, denies any dizziness or lightheadedness, denies any numbness or tingling, denies headache or weakness, denies any abdominal pain, denies any nausea or vomiting, denies any urinary symptoms, denies any weakness.      History provided by:  Patient   used: No                        No data recorded                Patient History   No past medical history on file.  Past Surgical History:   Procedure Laterality Date    OTHER SURGICAL HISTORY  09/25/2021    Neck surgery     No family history on file.  Social History     Tobacco Use    Smoking status: Not on file    Smokeless tobacco: Not on file   Substance Use Topics    Alcohol use: Not on file    Drug use: Not on file       Physical Exam   ED Triage Vitals [11/04/23 1043]   Temp Pulse Resp BP   36.8 °C (98.2 °F) -- 14 (!) 156/93      SpO2 Temp Source Heart Rate Source Patient Position   99 % Skin Monitor Sitting      BP Location FiO2 (%)     Right arm --       Physical Exam  Constitutional:       Appearance: Normal appearance.   Cardiovascular:      Rate and Rhythm: Normal rate and regular rhythm.   Pulmonary:      Effort: Pulmonary effort is normal.      Breath sounds: Normal breath sounds.   Musculoskeletal:         General: Normal  range of motion.   Skin:     Capillary Refill: Capillary refill takes less than 2 seconds.      Findings: Rash present.          Neurological:      General: No focal deficit present.      Mental Status: She is alert and oriented to person, place, and time.   Psychiatric:         Behavior: Behavior normal.       ED Course & MDM   Diagnoses as of 11/04/23 1101   Rash       Medical Decision Making  53-year-old female who denies significant past medical history presents emergency department today for evaluation of a 3-week rash.  Patient tells me that for the last 3 weeks she has been experiencing a rash to bilateral arms and chest.  Patient tells me that she has been using over-the-counter Benadryl with minimal relief.  Patient denies changing any soaps, denies any new perfumes, denies any new lotions, patient also denies any new laundry detergent or any outside contacts.  Patient denies any chest pain, shortness of breath, denies any difficulty breathing, denies any fever or chills, denies any dizziness or lightheadedness, denies any numbness or tingling, denies headache or weakness, denies any abdominal pain, denies any nausea or vomiting, denies any urinary symptoms, denies any weakness.    Patient seen examined emergency department; patient is healthy and nontoxic in appearance not appear in any acute distress.  Clinical exam significant for circular hive like rash to bilateral chest.  Rash is intact, there is no open areas or weeping.  Given patient's length of rash we will give her dose of steroids in the ER.  We will also send patient home with prescription for oral and topical steroids.  Patient also referred to dermatology.  Patient agreed with this medical plan assessment.  Patient discharged home in stable condition.          Procedure  Procedures     Jacklyn Raya, YOGESH-FELICIANO  11/04/23 1108